# Patient Record
(demographics unavailable — no encounter records)

---

## 2024-10-22 NOTE — HISTORY OF PRESENT ILLNESS
[de-identified] : Age: 51 year F PMHx: HTN  Hand Dominance: RHD Chief Complaint: Left index finger pain onset approx. 2021. Patient reports that her finger has been angling outwards along with pain and numbness. Patient also reports an increased sensitivity to coldness. Patient states that she has a chronic left index finger injury from 1991 where a glass window had nearly severed the finger. Patient required many stitches to repair the finger. Patient reports that her finger has been more bothersome since 2021, prompting her to get evaluated.  Trauma: chronic injury from 1991 Outside Imaging/Treatment: none OTC Medications: none OT/PT: none Bracing: none Pain worse with: exertion Pain better with: ice

## 2024-10-22 NOTE — ASSESSMENT
[FreeTextEntry1] : EXAM Left index finger with DIP thickening. No erythema nor drainage, mild ttp. Able to flex and extend at MCP, PIP and DIP. Sensation intact throughout. 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 No fracture nor dislocation. 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 Discussed management to consist of NSAIDs prn, activity modification and finger sleeves during use. Discussed excision is a potential operative intervention but is with a high rate of recurrence.   F/u prn